# Patient Record
Sex: FEMALE | Race: WHITE | URBAN - METROPOLITAN AREA
[De-identification: names, ages, dates, MRNs, and addresses within clinical notes are randomized per-mention and may not be internally consistent; named-entity substitution may affect disease eponyms.]

---

## 2017-06-12 ENCOUNTER — HOSPITAL ENCOUNTER (INPATIENT)
Facility: CLINIC | Age: 22
LOS: 1 days | Discharge: LEFT BEFORE DISCHARGE | DRG: 894 | End: 2017-06-13
Attending: PSYCHIATRY & NEUROLOGY | Admitting: PSYCHIATRY & NEUROLOGY
Payer: COMMERCIAL

## 2017-06-12 DIAGNOSIS — F19.20 CHEMICAL DEPENDENCY (H): Primary | ICD-10-CM

## 2017-06-12 DIAGNOSIS — F11.20 UNCOMPLICATED OPIOID DEPENDENCE (H): ICD-10-CM

## 2017-06-12 LAB
AMPHETAMINES UR QL SCN: ABNORMAL
BARBITURATES UR QL: ABNORMAL
BENZODIAZ UR QL: ABNORMAL
CANNABINOIDS UR QL SCN: ABNORMAL
COCAINE UR QL: ABNORMAL
ETHANOL UR QL SCN: ABNORMAL
HCG UR QL: NEGATIVE
OPIATES UR QL SCN: ABNORMAL
PCP UR QL SCN: ABNORMAL

## 2017-06-12 PROCEDURE — 12800008 ZZH R&B CD ADULT

## 2017-06-12 PROCEDURE — 84156 ASSAY OF PROTEIN URINE: CPT | Performed by: FAMILY MEDICINE

## 2017-06-12 PROCEDURE — HZ2ZZZZ DETOXIFICATION SERVICES FOR SUBSTANCE ABUSE TREATMENT: ICD-10-PCS | Performed by: PSYCHIATRY & NEUROLOGY

## 2017-06-12 PROCEDURE — 81025 URINE PREGNANCY TEST: CPT | Performed by: PSYCHIATRY & NEUROLOGY

## 2017-06-12 PROCEDURE — 80307 DRUG TEST PRSMV CHEM ANLYZR: CPT | Performed by: FAMILY MEDICINE

## 2017-06-12 PROCEDURE — 80320 DRUG SCREEN QUANTALCOHOLS: CPT

## 2017-06-12 PROCEDURE — 99285 EMERGENCY DEPT VISIT HI MDM: CPT | Mod: Z6 | Performed by: FAMILY MEDICINE

## 2017-06-12 PROCEDURE — 80307 DRUG TEST PRSMV CHEM ANLYZR: CPT | Performed by: PSYCHIATRY & NEUROLOGY

## 2017-06-12 PROCEDURE — 25000132 ZZH RX MED GY IP 250 OP 250 PS 637: Performed by: PSYCHIATRY & NEUROLOGY

## 2017-06-12 PROCEDURE — 99207 ZZC DOWN CODE DUE TO INITIAL EXAM: CPT | Performed by: PSYCHIATRY & NEUROLOGY

## 2017-06-12 PROCEDURE — 99221 1ST HOSP IP/OBS SF/LOW 40: CPT | Mod: AI | Performed by: PSYCHIATRY & NEUROLOGY

## 2017-06-12 PROCEDURE — 80320 DRUG SCREEN QUANTALCOHOLS: CPT | Performed by: FAMILY MEDICINE

## 2017-06-12 PROCEDURE — 99285 EMERGENCY DEPT VISIT HI MDM: CPT | Performed by: FAMILY MEDICINE

## 2017-06-12 PROCEDURE — 80320 DRUG SCREEN QUANTALCOHOLS: CPT | Performed by: PSYCHIATRY & NEUROLOGY

## 2017-06-12 PROCEDURE — 80307 DRUG TEST PRSMV CHEM ANLYZR: CPT

## 2017-06-12 RX ORDER — ACETAMINOPHEN 325 MG/1
650 TABLET ORAL EVERY 4 HOURS PRN
Status: DISCONTINUED | OUTPATIENT
Start: 2017-06-12 | End: 2017-06-13 | Stop reason: HOSPADM

## 2017-06-12 RX ORDER — NICOTINE 21 MG/24HR
1 PATCH, TRANSDERMAL 24 HOURS TRANSDERMAL DAILY
Status: DISCONTINUED | OUTPATIENT
Start: 2017-06-12 | End: 2017-06-13 | Stop reason: HOSPADM

## 2017-06-12 RX ORDER — BISACODYL 10 MG
10 SUPPOSITORY, RECTAL RECTAL DAILY PRN
Status: DISCONTINUED | OUTPATIENT
Start: 2017-06-12 | End: 2017-06-13 | Stop reason: HOSPADM

## 2017-06-12 RX ORDER — HYDROXYZINE HYDROCHLORIDE 25 MG/1
25-50 TABLET, FILM COATED ORAL EVERY 4 HOURS PRN
Status: DISCONTINUED | OUTPATIENT
Start: 2017-06-12 | End: 2017-06-13 | Stop reason: HOSPADM

## 2017-06-12 RX ORDER — NALOXONE HYDROCHLORIDE 0.4 MG/ML
.1-.4 INJECTION, SOLUTION INTRAMUSCULAR; INTRAVENOUS; SUBCUTANEOUS
Status: DISCONTINUED | OUTPATIENT
Start: 2017-06-12 | End: 2017-06-13 | Stop reason: HOSPADM

## 2017-06-12 RX ORDER — TRAZODONE HYDROCHLORIDE 50 MG/1
50 TABLET, FILM COATED ORAL
Status: DISCONTINUED | OUTPATIENT
Start: 2017-06-12 | End: 2017-06-13 | Stop reason: HOSPADM

## 2017-06-12 RX ORDER — ALBUTEROL SULFATE 90 UG/1
2 AEROSOL, METERED RESPIRATORY (INHALATION) 4 TIMES DAILY PRN
Status: DISCONTINUED | OUTPATIENT
Start: 2017-06-12 | End: 2017-06-13 | Stop reason: HOSPADM

## 2017-06-12 RX ORDER — BUPRENORPHINE 2 MG/1
2 TABLET SUBLINGUAL 4 TIMES DAILY
Status: DISCONTINUED | OUTPATIENT
Start: 2017-06-12 | End: 2017-06-12

## 2017-06-12 RX ORDER — BUPRENORPHINE 2 MG/1
2 TABLET SUBLINGUAL 4 TIMES DAILY PRN
Status: DISCONTINUED | OUTPATIENT
Start: 2017-06-12 | End: 2017-06-13

## 2017-06-12 RX ORDER — ALUMINA, MAGNESIA, AND SIMETHICONE 2400; 2400; 240 MG/30ML; MG/30ML; MG/30ML
30 SUSPENSION ORAL EVERY 4 HOURS PRN
Status: DISCONTINUED | OUTPATIENT
Start: 2017-06-12 | End: 2017-06-13 | Stop reason: HOSPADM

## 2017-06-12 RX ADMIN — NICOTINE 1 PATCH: 21 PATCH, EXTENDED RELEASE TRANSDERMAL at 19:44

## 2017-06-12 RX ADMIN — TRAZODONE HYDROCHLORIDE 50 MG: 50 TABLET ORAL at 22:08

## 2017-06-12 RX ADMIN — HYDROXYZINE HYDROCHLORIDE 50 MG: 25 TABLET ORAL at 22:08

## 2017-06-13 VITALS
TEMPERATURE: 97.6 F | RESPIRATION RATE: 16 BRPM | DIASTOLIC BLOOD PRESSURE: 65 MMHG | SYSTOLIC BLOOD PRESSURE: 106 MMHG | HEART RATE: 92 BPM

## 2017-06-13 LAB
ALBUMIN SERPL-MCNC: 4 G/DL (ref 3.4–5)
ALP SERPL-CCNC: 65 U/L (ref 40–150)
ALT SERPL W P-5'-P-CCNC: 14 U/L (ref 0–50)
AMPHETAMINES UR QL SCN: ABNORMAL
ANION GAP SERPL CALCULATED.3IONS-SCNC: 10 MMOL/L (ref 3–14)
AST SERPL W P-5'-P-CCNC: 14 U/L (ref 0–45)
BARBITURATES UR QL: ABNORMAL
BENZODIAZ UR QL: ABNORMAL
BILIRUB SERPL-MCNC: 0.7 MG/DL (ref 0.2–1.3)
BUN SERPL-MCNC: 13 MG/DL (ref 7–30)
CALCIUM SERPL-MCNC: 9.2 MG/DL (ref 8.5–10.1)
CANNABINOIDS UR QL SCN: ABNORMAL
CHLORIDE SERPL-SCNC: 113 MMOL/L (ref 94–109)
CHOLEST SERPL-MCNC: 120 MG/DL
CO2 SERPL-SCNC: 22 MMOL/L (ref 20–32)
COCAINE UR QL: ABNORMAL
CREAT SERPL-MCNC: 0.66 MG/DL (ref 0.52–1.04)
CREAT UR-MCNC: 210 MG/DL
ERYTHROCYTE [DISTWIDTH] IN BLOOD BY AUTOMATED COUNT: 12 % (ref 10–15)
ETHANOL UR QL SCN: ABNORMAL
GFR SERPL CREATININE-BSD FRML MDRD: ABNORMAL ML/MIN/1.7M2
GLUCOSE SERPL-MCNC: 103 MG/DL (ref 70–99)
HCT VFR BLD AUTO: 46.5 % (ref 35–47)
HDLC SERPL-MCNC: 26 MG/DL
HGB BLD-MCNC: 15.3 G/DL (ref 11.7–15.7)
LDLC SERPL CALC-MCNC: 74 MG/DL
MCH RBC QN AUTO: 31.7 PG (ref 26.5–33)
MCHC RBC AUTO-ENTMCNC: 32.9 G/DL (ref 31.5–36.5)
MCV RBC AUTO: 96 FL (ref 78–100)
NONHDLC SERPL-MCNC: 94 MG/DL
OPIATES UR QL SCN: ABNORMAL
PLATELET # BLD AUTO: 183 10E9/L (ref 150–450)
POTASSIUM SERPL-SCNC: 4.3 MMOL/L (ref 3.4–5.3)
PROT SERPL-MCNC: 7.2 G/DL (ref 6.8–8.8)
PROT UR-MCNC: 0.14 G/L
PROT/CREAT 24H UR: 0.07 G/G CR (ref 0–0.2)
RBC # BLD AUTO: 4.83 10E12/L (ref 3.8–5.2)
SODIUM SERPL-SCNC: 145 MMOL/L (ref 133–144)
TRIGL SERPL-MCNC: 100 MG/DL
TSH SERPL DL<=0.005 MIU/L-ACNC: 1.22 MU/L (ref 0.4–4)
WBC # BLD AUTO: 6.1 10E9/L (ref 4–11)

## 2017-06-13 PROCEDURE — 99207 ZZC CONSULT E&M CHANGED TO INITIAL LEVEL: CPT | Performed by: PHYSICIAN ASSISTANT

## 2017-06-13 PROCEDURE — 25000132 ZZH RX MED GY IP 250 OP 250 PS 637: Performed by: PSYCHIATRY & NEUROLOGY

## 2017-06-13 PROCEDURE — 36415 COLL VENOUS BLD VENIPUNCTURE: CPT | Performed by: PSYCHIATRY & NEUROLOGY

## 2017-06-13 PROCEDURE — 99239 HOSP IP/OBS DSCHRG MGMT >30: CPT | Performed by: PSYCHIATRY & NEUROLOGY

## 2017-06-13 PROCEDURE — 85027 COMPLETE CBC AUTOMATED: CPT | Performed by: PSYCHIATRY & NEUROLOGY

## 2017-06-13 PROCEDURE — 84443 ASSAY THYROID STIM HORMONE: CPT | Performed by: PSYCHIATRY & NEUROLOGY

## 2017-06-13 PROCEDURE — 99221 1ST HOSP IP/OBS SF/LOW 40: CPT | Performed by: PHYSICIAN ASSISTANT

## 2017-06-13 PROCEDURE — 80053 COMPREHEN METABOLIC PANEL: CPT | Performed by: PSYCHIATRY & NEUROLOGY

## 2017-06-13 PROCEDURE — 80061 LIPID PANEL: CPT | Performed by: PSYCHIATRY & NEUROLOGY

## 2017-06-13 RX ORDER — BUPRENORPHINE 2 MG/1
2 TABLET SUBLINGUAL 4 TIMES DAILY
Status: DISCONTINUED | OUTPATIENT
Start: 2017-06-13 | End: 2017-06-13 | Stop reason: HOSPADM

## 2017-06-13 RX ORDER — PROCHLORPERAZINE MALEATE 5 MG
5-10 TABLET ORAL EVERY 6 HOURS PRN
Status: DISCONTINUED | OUTPATIENT
Start: 2017-06-13 | End: 2017-06-13 | Stop reason: HOSPADM

## 2017-06-13 RX ORDER — ONDANSETRON 4 MG/1
4 TABLET, ORALLY DISINTEGRATING ORAL EVERY 6 HOURS PRN
Status: DISCONTINUED | OUTPATIENT
Start: 2017-06-13 | End: 2017-06-13 | Stop reason: HOSPADM

## 2017-06-13 RX ORDER — BUPRENORPHINE AND NALOXONE 8; 2 MG/1; MG/1
1 FILM, SOLUBLE BUCCAL; SUBLINGUAL DAILY
Qty: 7 FILM | Refills: 0 | Status: SHIPPED | OUTPATIENT
Start: 2017-06-13 | End: 2017-06-13

## 2017-06-13 RX ADMIN — NICOTINE 1 PATCH: 21 PATCH, EXTENDED RELEASE TRANSDERMAL at 08:02

## 2017-06-13 RX ADMIN — BUPRENORPHINE HCL 2 MG: 2 TABLET SUBLINGUAL at 08:02

## 2017-06-13 RX ADMIN — HYDROXYZINE HYDROCHLORIDE 50 MG: 25 TABLET ORAL at 09:09

## 2017-06-13 RX ADMIN — BUPRENORPHINE HCL 2 MG: 2 TABLET SUBLINGUAL at 11:32

## 2017-06-13 ASSESSMENT — ACTIVITIES OF DAILY LIVING (ADL)
GROOMING: INDEPENDENT
ORAL_HYGIENE: INDEPENDENT
DRESS: INDEPENDENT
LAUNDRY: WITH SUPERVISION

## 2017-06-13 NOTE — PROGRESS NOTES
[facility], [place]   Psychiatric Progress Note    Interim history   This is a 21 year old female with opiate dependence.Pt seen in rounds. Patient's mood is good   Energy Level:MODERATE  Sleep:No concerns, sleeps well through night  Appetite:normal motivation interest good   deneid any Suicidal/homicidal ideation/plan intent.  deneid any psychosis  No prior suicde attempts  No access to gun  Pt is in detox  Pt mssa/cows score are monitered  Tolerating meds and has no side effects.              Medications:     Current Facility-Administered Medications   Medication     buprenorphine (SUBUTEX) sublingual tablet 2 mg     naloxone (NARCAN) injection 0.1-0.4 mg     hydrOXYzine (ATARAX) tablet 25-50 mg     acetaminophen (TYLENOL) tablet 650 mg     alum & mag hydroxide-simethicone (MYLANTA ES/MAALOX  ES) suspension 30 mL     magnesium hydroxide (MILK OF MAGNESIA) suspension 30 mL     bisacodyl (DULCOLAX) Suppository 10 mg     traZODone (DESYREL) tablet 50 mg     nicotine Patch in Place     nicotine patch REMOVAL     nicotine (NICODERM CQ) 21 MG/24HR 24 hr patch 1 patch     albuterol (PROAIR HFA/PROVENTIL HFA/VENTOLIN HFA) Inhaler 2 puff             Allergies:   No Known Allergies         Psychiatric Examination:   Blood pressure 111/72, pulse 65, temperature 97.7  F (36.5  C), temperature source Oral, resp. rate 16.  Weight is 0 lbs 0 oz  There is no height or weight on file to calculate BMI.    Appearance:  awake, alert and adequately groomed  Attitude:  cooperative  Eye Contact:  good  Mood:  better  Affect:  appropriate and in normal range and mood congruent  Speech:  clear, coherent rate /rhythm are good  Psychomotor Behavior:  no evidence of tardive dyskinesia, dystonia, or tics and intact station, gait and muscle tone  Throught Process:  logical  Associations:  no loose associations  Thought Content:  no evidence of suicidal ideation or homicidal ideation, no evidence of psychotic  thought, no auditory hallucinations present and no visual hallucinations present  Insight:  good  Judgement:  intact  Oriented to:  time, person, and place  Attention Span and Concentration:  intact  Recent and Remote Memory:  intact  Language fund of knowledge are adequate         Labs:     No results found for: NTBNPI, NTBNP  Lab Results   Component Value Date    WBC 6.1 06/13/2017    HGB 15.3 06/13/2017    HCT 46.5 06/13/2017    MCV 96 06/13/2017     06/13/2017     Lab Results   Component Value Date    TSH 1.22 06/13/2017              Dx opiate dependence  Plan   will detox off opiates using buprenorphine 2mg sl qid   Laboratory/Imaging: reviewed with patient   Consults: internal medicine consult reviewed  Patient will be treated in therapeutic milieu with appropriate individual and group therapies as described.            Legal Status: voluntary    Safety Assessment:   Checks:  15 min  Precautions: withdrawal precautions  Pt has not required locked seclusion or restraints in the past 24 hours to maintain safety, please refer to RN documentation for further details.    Able to give informed consent:  YES   Discussed Risks/Benefits/Side Effects/Alternatives: YES    After discussion of the indications, risks, benefits, alternatives and consequences of no treatment, the patient elects to complete detox and do trt.

## 2017-06-13 NOTE — CONSULTS
"  [facility]  Internal Medicine Consult     Deysi Loyola MRN# [id number]   Age: 21 year old YOB: 1995     Date of Admission: 6/12/2017  Date of Consult:  6/13/2017    Primary Care Provider: No Ref-Primary, Physician    Requesting Service: Psychiatry  Reason for Consult: General Medical Evaluation      SUBJECTIVE   CC:   Chronic cough   HPI:   Deysi is a 21 year old female with history of opiate dependence who presented to [place] ED for detox from opiates. She reports inhaling up to 2gm of heroin daily for the past 6 years. She has been in treatment 4 times. She denies injecting or any IV drug usage. She has experienced withdrawal previously, but reports this withdrawal being the worst. She reports symptoms of anxiety, restlessness, cramps, chills, and intermittent nausea. Denies nausea currently.     She reports having a \"chronic smokers cough\" for which she uses albuterol that provides relief. She denies any other medical problems or concerns today. Denies recent illness, fever, confusion, acute visual changes, dizziness, chest pain, cough, abdominal pain, N/V, urinary symptoms, change in bowels, or rash       Past Medical History:     Past Medical History:   Diagnosis Date     Opiate abuse, continuous         Reviewed and updated in [facility].     Past Surgical History:      Past Surgical History:   Procedure Laterality Date     denies           Social History:   Tobacco use: 1 pack per day   Alcohol use: \"4 times per year\"  Elicit drug use: Heroin as described in HPI; denies any other drug use    Reviewed and updated in Epic.     Family History:     Family History   Problem Relation Age of Onset     Hypertension Mother      DIABETES Father          Allergies:   No Known Allergies      Medications:   Reviewed. Please see MAR     Review of Systems:   10 point ROS of systems including Constitutional, Eyes, Respiratory, Cardiovascular, Gastroenterology, Genitourinary, " "Integumentary, Muscularskeletal, Psychiatric were all negative except for pertinent positives noted in my HPI.      OBJECTIVE   Physical Exam:   Vitals were reviewed  Blood pressure 106/65, pulse 92, temperature 97.6  F (36.4  C), resp. rate 16.  General: Alert, NAD  Cardiovascular: RRR, S1S2  Lungs: CTAB  Abdomen: + BS, soft with no distention and no tenderness   Vascular: no peripheral edema, distal pulses palpable  Neurologic: AAO X 3, no focal deficits  Neuropsychiatric: Per psych  Skin: No jaundice, rashes, or lesions        Data:        No results found for: NA No results found for: CHLORIDE No results found for: BUN   No results found for: POTASSIUM No results found for: CO2 No results found for: CR     Lab Results   Component Value Date    WBC 6.1 06/13/2017    HGB 15.3 06/13/2017    HCT 46.5 06/13/2017    MCV 96 06/13/2017     06/13/2017     Lab Results   Component Value Date    WBC 6.1 06/13/2017           Assessment and Plan/Recommendations:     Deysi is a 21 year old female with history of opiate dependence who presented to [place] ED for detox and was admitted for detox from opiates.    Opiate dependence: Patient inhales up to 2gm heroin daily for previous 6 years. Voluntary for opiate detox. Withdrawal symptoms present, but controlled at this time. CBC, lipid panel, TSH unremarkable.  - Managed per psychiatry  - Encouraged PO intake as tolerated to maintain hydration    Chronic cough: Patient reports chronic \"smokers cough.\" Continue PTA Albuterol PRN.    Mild hypernatremia: In the setting of N/V, poor oral intake. Nausea improving, agreeable to trying to eat and drink more. Will likely normalize with increased PO intake as withdrawal symptoms lessen  - Encourage fluids  - Patient care order to contact medicine if patient not tolerating PO intake or if N/V continues      Currently, medically stable and internal medicine will sign off. Please contact if future questions or concerns arise. " Thank you for the opportunity to be a part of this patient's care.      Internal Medicine [facility]  [phone]  June 13, 2017

## 2017-06-13 NOTE — DOWNTIME EVENT NOTE
The EMR was down for 7 hours on 6/13/2017.    PETER Núñez was responsible for completing the paper charting during this time period.     The following information was re-entered into the system by Chacha Núñez: Allergies, Home meds and MAR    The following information will remain in the paper chart: Remainder of chart     Chacha Núñez  6/13/2017

## 2017-06-13 NOTE — H&P
DATE OF ADMISSION:  06/12/2017      IDENTIFYING INFORMATION:  Deysi Loyola is a 21-year-old single  female.  She does not have any children.  She is unemployed, lives with a friend.      CHIEF COMPLAINT:  Using for 3 years.      HISTORY OF PRESENT ILLNESS:  The patient started using at the age of 15 heroin.  She smoked it and she developed tolerance to it, withdrawal from it, spent more time, more amount, tried to quit unsuccessfully, used despite negative consequences, job, family.  She does not use alcohol.  She drinks 4 times a year.  She smokes tobacco, does not use any drugs.  She was sober for 6 months and then relapsed and has been struggling for 3 years.  She used Xanax once.  She denies any depression, anxiety, psychosis, ivan.      PAST PSYCHIATRIC HISTORY:  Never psychiatrically hospitalized.  She was for chemical dependency treatments in [place].  She had ECT, never had any commitments.      PAST MEDICAL HISTORY:  A 10-point systems was reviewed and is negative.  She says her last menstrual period 05/26.  She denies any physical symptoms.        The patient's vitals are as below:  Temperature of 97.5, pulse of 54, heart rate of 54, blood pressure of 113/64, respiratory rate 16.      FAMILY HISTORY:  Dad has anxiety.  Brother uses marijuana.      SOCIAL HISTORY:  She had a good childhood, no abuse,   She used to work in [employer].      MENTAL STATUS EXAMINATION:  The patient is a 21-year-old  female who appears her stated age, adequate grooming, adequate hygiene, maintains good eye contact.  No psychomotor abnormalities.  No gait problems.  Mood is euthymic.  Affect is congruent.  Speech is spontaneous, normal rate.  Does not have any suicidal or homicidal ideation, denied auditory hallucinations.  Alert and oriented x3.  Recent and remote memory, language, fund of knowledge are all adequate.  No loose associations.  The patient does not have any suicidal or homicidal ideation, plan or intent.   No loose associations.      DIAGNOSES:   Axis I:  Opiate use disorder, severe.     Opiate withdrawal.   Nicotine dependence.      PLAN:  The patient will be detoxed off opiates using buprenorphine.  The patient will be seen by case management and Internal Medicine.  The patient at this time does not have any suicidal or homicidal ideation, plan or intent.  She wants to finish detox and wants to be on Suboxone maintenance.         FELIX HSIEH MD             D: 2017 07:52   T: 2017 08:24   MT: RUSSELL      Name:     JUICE REAGAN   MRN:      [id number]        Account:      [id number]   :      1995           Admitted:     [id number]      Document: [id number]

## 2017-06-13 NOTE — PLAN OF CARE
"Problem: Substance Withdrawal  Goal: Substance Withdrawal  Signs and symptoms of listed problems will be absent or manageable.  Outcome: No Change  Patient's opiate assessment score was 11 this morning and she was started on Buprenorphine 2 mg qid scheduled. Patients Cow's score was 10 at mid day and she received another Buprenorphine 2 mg at that time.  Patient was given Vistaril 50 mg for complaint of anxiety and shortly after started vomiting. Order for Zofran 4 mg ODT was obtained and was given. Patient reported that nausea has resolved. Patient resting in bed most of the morning. She has been unable to eat any solid food, sipping on ginger ale. Internal medicine here to see patient for admission. Patient to have a CMP drawn. Patient came to desk and requested to be discharged. Attempted to process with patient and she responded \"I am not feeling any better. I signed myself in and I can sign myself out!\" Dr. Peralta consulted. Patient was offered an increase in Suboxone and asked to stay. \"No I don't think so, I think the Suboxone is making it worse!\" Dr. Peralta notified and patient is to be discharged against medical advice. She signed the Leaving against Medical Advice Form.  She denies any thoughts of self harm, denies any thoughts of harming others. She was informed that she could always return to the Emergency Room if she changed her mind.     "

## 2017-06-14 NOTE — DISCHARGE SUMMARY
More than 35 minutes spent on discharge summary, doing the discharge instructions, discharge medications and discharge mental status examination.      DISCHARGE DIAGNOSIS:  Opiate use disorder, severe.      IDENTIFYING INFORMATION:  Deysi Reagan is a 21-year-old  female admitted for detox.  Please review the detailed admission note by Dr. Peralta on 2017.  The patient was seen on  but the discharge was done on .        During the hospitalization, the patient's saw Donna Cartagena.  Please review the detailed note on  for Internal Medicine consult.  The patient was having lab work done which shows TSH unremarkable.  Beta hCG is negative.  Sodium is 145.  Chloride was 113.  HDL cholesterol is low.      HOSPITAL COURSE:  During the hospitalization the patient had received Suboxone 4 mg.  Her score was 10.  She felt that she needs to leave.  We offered to give her more Suboxone and also  does not want to stay.  She wants to discharge.  The prognosis is guarded if she leaves, but she wants to be discharged.  She is not suicidal.  She is not homicidal.  She is not holdable.  She will be discharged AMA.      DISCHARGE DISPOSITION:  The patient going AMA.      DISCHARGE MENTAL STATUS EXAMINATION:  The patient is alert, oriented x3.  Recent and remote memory, language and fund of knowledge are all adequate.  No loose associations.  The patient does not have any suicidal or homicidal ideation, plan or intent.         FELIX PERALTA MD             D: 2017 13:27   T: 2017 12:11   MT: SK      Name:     DEYSI REAGAN   MRN:      [id number]        Account:        [id number]   :      1995           Admit Date:                                       Discharge Date: 2017      Document: [id number]